# Patient Record
Sex: FEMALE | Race: BLACK OR AFRICAN AMERICAN | NOT HISPANIC OR LATINO | Employment: FULL TIME | URBAN - METROPOLITAN AREA
[De-identification: names, ages, dates, MRNs, and addresses within clinical notes are randomized per-mention and may not be internally consistent; named-entity substitution may affect disease eponyms.]

---

## 2018-08-01 ENCOUNTER — HOSPITAL ENCOUNTER (EMERGENCY)
Facility: OTHER | Age: 18
Discharge: HOME | End: 2018-08-01
Attending: EMERGENCY MEDICINE
Payer: MEDICAID

## 2018-08-01 VITALS
HEIGHT: 66 IN | OXYGEN SATURATION: 100 % | BODY MASS INDEX: 28.93 KG/M2 | SYSTOLIC BLOOD PRESSURE: 116 MMHG | HEART RATE: 70 BPM | RESPIRATION RATE: 18 BRPM | TEMPERATURE: 98 F | WEIGHT: 180 LBS | DIASTOLIC BLOOD PRESSURE: 58 MMHG

## 2018-08-01 DIAGNOSIS — K04.7 DENTAL ABSCESS: Primary | ICD-10-CM

## 2018-08-01 LAB
B-HCG UR QL: NEGATIVE
CTP QC/QA: YES

## 2018-08-01 PROCEDURE — 99283 EMERGENCY DEPT VISIT LOW MDM: CPT | Mod: 25 | Performed by: EMERGENCY MEDICINE

## 2018-08-01 PROCEDURE — 81025 URINE PREGNANCY TEST: CPT | Performed by: EMERGENCY MEDICINE

## 2018-08-01 PROCEDURE — 41800 DRAINAGE OF GUM LESION: CPT | Performed by: EMERGENCY MEDICINE

## 2018-08-01 RX ORDER — PENICILLIN V POTASSIUM 500 MG/1
500 TABLET, FILM COATED ORAL 4 TIMES DAILY
Qty: 28 TABLET | Refills: 0 | Status: SHIPPED | OUTPATIENT
Start: 2018-08-01 | End: 2018-08-01 | Stop reason: ALTCHOICE

## 2018-08-01 RX ORDER — IBUPROFEN 800 MG/1
800 TABLET ORAL 3 TIMES DAILY
COMMUNITY
End: 2022-11-10

## 2018-08-01 NOTE — ED TRIAGE NOTES
Pt present to the ED with c/o facial swelling that started today. Pt states that she was seen at  yesterday and was told that she has a abscess. Pt states that she's not sure where the abscess is located. Pt states that she is currently taking amoxicillin and ibuprofen. Pt states that she has pain to the left side of her face. Pt rates pain as a 3/10. Pt states that she has a dental appt tomorrow. Pt denies fever, chill, abscess drainage and sob. Pt in NAD. Pt breathing is even and unlabored.

## 2018-08-01 NOTE — ED PROVIDER NOTES
"Encounter Date: 8/1/2018    SCRIBE #1 NOTE: I, Angy Leslie, am scribing for, and in the presence of, Dr. Flores.       History     Chief Complaint   Patient presents with    Dental Pain     LL molar, has dental appt tomorrow     Time seen by provider: 6:19 PM    This is a 18 y.o. female who presents with complaint of left lower dental pain that reoccurred yesterday. This episode is consistent with two prior episodes that have occurred in the last several months. She reports taking medication with each episode of pain. She was informed that "the tooth was infected" and instructed to return for tooth extraction during her last dental appointment four months ago. The patient has not followed up because she does not have insurance. She was started on antibiotics when seen in Urgent Care yesterday for the same complaint. She reports left facial swelling that began this morning, but denies fever, chills, ear pain, nausea, or myalgias.       The history is provided by the patient.     Review of patient's allergies indicates:  No Known Allergies  History reviewed. No pertinent past medical history.  History reviewed. No pertinent surgical history.  History reviewed. No pertinent family history.  Social History   Substance Use Topics    Smoking status: Never Smoker    Smokeless tobacco: Not on file    Alcohol use No     Review of Systems   Constitutional: Negative for appetite change, chills, diaphoresis and fever.   HENT: Positive for dental problem (left lower) and facial swelling. Negative for congestion, ear pain, sore throat and trouble swallowing.    Respiratory: Negative for cough, chest tightness and shortness of breath.    Cardiovascular: Negative for chest pain.   Gastrointestinal: Negative for abdominal pain, nausea and vomiting.   Genitourinary: Negative for flank pain.   Musculoskeletal: Negative for back pain, myalgias and neck pain.   Skin: Negative for rash.   Neurological: Negative for weakness and " headaches.       Physical Exam     Initial Vitals [08/01/18 1720]   BP Pulse Resp Temp SpO2   126/64 88 18 98.8 °F (37.1 °C) 98 %      MAP       --         Physical Exam    Nursing note and vitals reviewed.  Constitutional: She appears well-developed and well-nourished. She is not diaphoretic. No distress.   HENT:   Head: Normocephalic and atraumatic.   Right Ear: External ear normal.   Left Ear: External ear normal.   Mouth/Throat: Oropharynx is clear and moist.   Tender, fluctuant area of swelling to the left lower mandibular outer gums.  No drainage.  Facial swelling to the left mandible with no overlying erythema. No trismus. No sublingual or submental tenderness or swelling.    Eyes: EOM are normal. Pupils are equal, round, and reactive to light. No scleral icterus.   Neck: Normal range of motion.   Pulmonary/Chest: No respiratory distress.   Musculoskeletal: Normal range of motion. She exhibits no edema.   Neurological: She is alert and oriented to person, place, and time.   Skin: Skin is warm and dry. No rash and no abscess noted. No erythema. No pallor.   Psychiatric: She has a normal mood and affect. Her behavior is normal. Judgment and thought content normal.         ED Course   I & D - Incision and Drainage  Date/Time: 8/1/2018 6:24 PM  Performed by: NICOLÁS CONTRERAS.  Authorized by: NICOLÁS CONTRERAS   Type: abscess (dental)  Body area: mouth  Patient sedated: no  Scalpel size: 11  Incision type: single straight  Complexity: simple  Drainage: pus and  bloody  Drainage amount: scant  Wound treatment: incision,  drainage and  wound left open (suction)  Patient tolerance: Patient tolerated the procedure well with no immediate complications        Labs Reviewed   POCT URINE PREGNANCY             Medical Decision Making:   History:   Old Medical Records: I decided to obtain old medical records.  Initial Assessment:   6:19PM:  Pt is a 17 y/o F who presents to ED with facial swelling with an area consistent with an  abscess in her lower outer mandibular gums.  Will plan to I&D, will continue to follow and reassess.  Clinical Tests:   Lab Tests: Ordered and Reviewed    6:46PM:  Pt tolerated I&D well with pus drainage.  Will have her continue abx and follow up with her dentist.  I counseled pt regarding supportive care measures.  I have discussed with the pt ED return warnings and need for close PCP f/u.  Pt agreeable to plan and all questions answered.  I feel that pt is stable for discharge and management as an outpatient and no further intervention is needed at this time.  Pt is comfortable returning to the ED if needed.  Will DC home in stable condition.                Scribe Attestation:   Scribe #1: I performed the above scribed service and the documentation accurately describes the services I performed. I attest to the accuracy of the note.    Attending Attestation:           Physician Attestation for Scribe:  Physician Attestation Statement for Scribe #1: I, Dr. Flores, reviewed documentation, as scribed by Angy Leslie in my presence, and it is both accurate and complete.                    Clinical Impression:     1. Dental abscess                                   Verona Flores MD  08/01/18 [id number]

## 2018-08-01 NOTE — DISCHARGE INSTRUCTIONS
Continue your antibiotics.    Please return to the ER if you have chest pain, difficulty breathing, fevers, altered mental status, dizziness, weakness, or any other concerns.      Follow up with your dentist.

## 2023-06-27 ENCOUNTER — PATIENT MESSAGE (OUTPATIENT)
Dept: RESEARCH | Facility: HOSPITAL | Age: 23
End: 2023-06-27